# Patient Record
Sex: FEMALE | Race: WHITE | NOT HISPANIC OR LATINO | URBAN - METROPOLITAN AREA
[De-identification: names, ages, dates, MRNs, and addresses within clinical notes are randomized per-mention and may not be internally consistent; named-entity substitution may affect disease eponyms.]

---

## 2017-11-18 ENCOUNTER — OFFICE VISIT (OUTPATIENT)
Dept: URGENT CARE | Facility: URGENT CARE | Age: 22
End: 2017-11-18
Payer: COMMERCIAL

## 2017-11-18 VITALS
BODY MASS INDEX: 24.07 KG/M2 | OXYGEN SATURATION: 97 % | TEMPERATURE: 97.4 F | HEART RATE: 71 BPM | DIASTOLIC BLOOD PRESSURE: 82 MMHG | HEIGHT: 64 IN | SYSTOLIC BLOOD PRESSURE: 102 MMHG | WEIGHT: 141 LBS

## 2017-11-18 DIAGNOSIS — R07.0 THROAT PAIN: ICD-10-CM

## 2017-11-18 DIAGNOSIS — J03.90 EXUDATIVE TONSILLITIS: Primary | ICD-10-CM

## 2017-11-18 LAB
DEPRECATED S PYO AG THROAT QL EIA: NORMAL
SPECIMEN SOURCE: NORMAL

## 2017-11-18 PROCEDURE — 99203 OFFICE O/P NEW LOW 30 MIN: CPT | Performed by: INTERNAL MEDICINE

## 2017-11-18 PROCEDURE — 87081 CULTURE SCREEN ONLY: CPT | Performed by: INTERNAL MEDICINE

## 2017-11-18 PROCEDURE — 87880 STREP A ASSAY W/OPTIC: CPT | Performed by: INTERNAL MEDICINE

## 2017-11-18 RX ORDER — AMOXICILLIN 500 MG/1
500 CAPSULE ORAL 3 TIMES DAILY
Qty: 30 CAPSULE | Refills: 0 | Status: SHIPPED | OUTPATIENT
Start: 2017-11-18 | End: 2019-05-08

## 2017-11-18 ASSESSMENT — ENCOUNTER SYMPTOMS
MYALGIAS: 1
COUGH: 0
HEADACHES: 1
CHILLS: 0
NECK PAIN: 1

## 2017-11-18 NOTE — NURSING NOTE
"Chief Complaint   Patient presents with     Urgent Care     Pt in clinic c/o sore throat for 1 day.     Pharyngitis       Initial /82  Pulse 71  Temp 97.4  F (36.3  C) (Tympanic)  Ht 5' 4\" (1.626 m)  Wt 141 lb (64 kg)  SpO2 97%  BMI 24.2 kg/m2 Estimated body mass index is 24.2 kg/(m^2) as calculated from the following:    Height as of this encounter: 5' 4\" (1.626 m).    Weight as of this encounter: 141 lb (64 kg).  Medication Reconciliation: complete   Shannon Holden/ MA    "

## 2017-11-18 NOTE — PROGRESS NOTES
"SUBJECTIVE:   Cely Monae is a 22 year old female presenting with a chief complaint of   Chief Complaint   Patient presents with     Urgent Care     Pt in clinic c/o sore throat for 1 day.     Pharyngitis   .    Onset of symptoms was 1 day(s) ago.  Current and Associated symptoms: sore throat  Treatment measures tried include Tylenol/Ibuprofen.  Predisposing factors include None.    Also sick 2 weeks ago with sore throat - resolved       Review of Systems   Constitutional: Negative for chills.        Feels warm   HENT: Negative for ear pain.    Respiratory: Negative for cough.    Musculoskeletal: Positive for myalgias and neck pain.   Neurological: Positive for headaches.         Past Medical History:   Diagnosis Date     NO ACTIVE PROBLEMS      Current Outpatient Prescriptions   Medication Sig Dispense Refill     amoxicillin (AMOXIL) 500 MG capsule Take 1 capsule (500 mg) by mouth 3 times daily 30 capsule 0     Social History   Substance Use Topics     Smoking status: Never Smoker     Smokeless tobacco: Never Used     Alcohol use Not on file       OBJECTIVE  /82  Pulse 71  Temp 97.4  F (36.3  C) (Tympanic)  Ht 5' 4\" (1.626 m)  Wt 141 lb (64 kg)  SpO2 97%  BMI 24.2 kg/m2    Physical Exam   Constitutional: She is well-developed, well-nourished, and in no distress.   fatigued   HENT:   Nose: Nose normal.   Mouth/Throat: No oropharyngeal exudate.   tympanic membrane clear  OP with lot PND   Eyes: Conjunctivae are normal.   Cardiovascular: Normal rate, regular rhythm and normal heart sounds.    Pulmonary/Chest: Effort normal and breath sounds normal. She has no wheezes.   Lymphadenopathy:     She has cervical adenopathy.       Labs:  Results for orders placed or performed in visit on 11/18/17 (from the past 24 hour(s))   Strep, Rapid Screen   Result Value Ref Range    Specimen Description Throat     Rapid Strep A Screen       NEGATIVE: No Group A streptococcal antigen detected by immunoassay, await " culture report.       X-Ray was not done.    ASSESSMENT:      ICD-10-CM    1. Exudative tonsillitis J03.90 amoxicillin (AMOXIL) 500 MG capsule   2. Throat pain R07.0 Strep, Rapid Screen     Beta strep group A culture        Medical Decision Making:    Differential Diagnosis:  strep    Serious Comorbid Conditions:  none    PLAN:      Patient Instructions     Fluids  Rest  ibuprofen   amox 3 x day for 10 days.    Specimen Description Throat     Rapid Strep A Screen NEGATIVE: No Group A streptococcal antigen detected by immunoassay, await culture report.     [facility]

## 2017-11-18 NOTE — MR AVS SNAPSHOT
"              After Visit Summary   11/18/2017    Cely Monae    MRN: [id number]           Patient Information     Date Of Birth          1995        Visit Information        Provider Department      11/18/2017 8:15 AM Dalia Goff MD [facility]        Today's Diagnoses     Exudative tonsillitis    -  1    Throat pain          Care Instructions    Fluids  Rest  ibuprofen   amox 3 x day for 10 days.    Specimen Description Throat     Rapid Strep A Screen NEGATIVE: No Group A streptococcal antigen detected by immunoassay, await culture report.     Resulting Agency [facility]                Follow-ups after your visit        Who to contact     If you have questions or need follow up information about today's clinic visit or your schedule please contact [facility] directly at [phone].  Normal or non-critical lab and imaging results will be communicated to you by MyChart, letter or phone within 4 business days after the clinic has received the results. If you do not hear from us within 7 days, please contact the clinic through MyChart or phone. If you have a critical or abnormal lab result, we will notify you by phone as soon as possible.  Submit refill requests through [facility] or call your pharmacy and they will forward the refill request to us. Please allow 3 business days for your refill to be completed.          Additional Information About Your Visit        MyChart Information     [facility] lets you send messages to your doctor, view your test results, renew your prescriptions, schedule appointments and more. To sign up, go to [email or web address] . Click on \"Log in\" on the left side of the screen, which will take you to the Welcome page. Then click on \"Sign up Now\" on the right side of the page.     You will be asked to enter the access code listed below, as well as some personal information. Please follow the " "directions to create your username and password.     Your access code is: [email or web address]  Expires: 2018  8:46 AM     Your access code will  in 90 days. If you need help or a new code, please call your [facility] clinic or [phone].        Care EveryWhere ID     This is your Care EveryWhere ID. This could be used by other organizations to access your [facility] medical records  [id number]        Your Vitals Were     Pulse Temperature Height Pulse Oximetry BMI (Body Mass Index)       71 97.4  F (36.3  C) (Tympanic) 5' 4\" (1.626 m) 97% 24.2 kg/m2        Blood Pressure from Last 3 Encounters:   17 102/82    Weight from Last 3 Encounters:   17 141 lb (64 kg)              We Performed the Following     Beta strep group A culture     Strep, Rapid Screen          Today's Medication Changes          These changes are accurate as of: 17  8:52 AM.  If you have any questions, ask your nurse or doctor.               Start taking these medicines.        Dose/Directions    amoxicillin 500 MG capsule   Commonly known as:  AMOXIL   Used for:  Exudative tonsillitis   Started by:  Dalia Goff MD        Dose:  500 mg   Take 1 capsule (500 mg) by mouth 3 times daily   Quantity:  30 capsule   Refills:  0            Where to get your medicines      These medications were sent to [facility] - [place], [place] - [address]  [address], [place] [address]     Phone:  [phone]     amoxicillin 500 MG capsule                Primary Care Provider    Physician No Ref-Primary       NO REF-PRIMARY PHYSICIAN        Equal Access to Services     [facility]: Hadii luis antonio bryant hadasho Soomaali, waaxda luqadaha, qaybta kaalmada adeegtiti, nina lucero. So [facility] [phone].    ATENCIÓN: Si habla español, tiene a young disposición servicios gratuitos de asistencia lingüística. Llame al [phone].    We comply with applicable federal civil rights laws and " [place] laws. We do not discriminate on the basis of race, color, national origin, age, disability, sex, sexual orientation, or gender identity.            Thank you!     Thank you for choosing [facility]  for your care. Our goal is always to provide you with excellent care. Hearing back from our patients is one way we can continue to improve our services. Please take a few minutes to complete the written survey that you may receive in the mail after your visit with us. Thank you!             Your Updated Medication List - Protect others around you: Learn how to safely use, store and throw away your medicines at www.disposemymeds.org.          This list is accurate as of: 11/18/17  8:52 AM.  Always use your most recent med list.                   Brand Name Dispense Instructions for use Diagnosis    amoxicillin 500 MG capsule    AMOXIL    30 capsule    Take 1 capsule (500 mg) by mouth 3 times daily    Exudative tonsillitis

## 2017-11-18 NOTE — PATIENT INSTRUCTIONS
Fluids  Rest  ibuprofen   amox 3 x day for 10 days.    Specimen Description Throat     Rapid Strep A Screen NEGATIVE: No Group A streptococcal antigen detected by immunoassay, await culture report.     [facility]

## 2017-11-19 LAB
BACTERIA SPEC CULT: NORMAL
SPECIMEN SOURCE: NORMAL

## 2019-05-08 ENCOUNTER — OFFICE VISIT (OUTPATIENT)
Dept: FAMILY MEDICINE | Facility: CLINIC | Age: 24
End: 2019-05-08
Payer: COMMERCIAL

## 2019-05-08 ENCOUNTER — RECORDS - HEALTHEAST (OUTPATIENT)
Dept: ADMINISTRATIVE | Facility: OTHER | Age: 24
End: 2019-05-08

## 2019-05-08 VITALS
HEIGHT: 66 IN | DIASTOLIC BLOOD PRESSURE: 62 MMHG | BODY MASS INDEX: 22.69 KG/M2 | OXYGEN SATURATION: 98 % | WEIGHT: 141.2 LBS | HEART RATE: 51 BPM | SYSTOLIC BLOOD PRESSURE: 97 MMHG | RESPIRATION RATE: 16 BRPM | TEMPERATURE: 98.5 F

## 2019-05-08 DIAGNOSIS — J03.90 TONSILLITIS: Primary | ICD-10-CM

## 2019-05-08 LAB — MONONUCLEOSIS SCREEN: NEGATIVE

## 2019-05-08 ASSESSMENT — MIFFLIN-ST. JEOR: SCORE: 1414.29

## 2019-05-08 NOTE — PROGRESS NOTES
"Preceptor attestation:  Vital signs reviewed: BP 97/62   Pulse 51   Temp 98.5  F (36.9  C) (Oral)   Resp 16   Ht 1.664 m (5' 5.5\")   Wt 64 kg (141 lb 3.2 oz)   SpO2 98%   BMI 23.14 kg/m      Patient seen, evaluated, and discussed with the resident.  I have verified the content of the note, which accurately reflects my assessment of the patient and the plan of care.    Supervising physician: Patti Han MD  [facility]  "

## 2019-05-08 NOTE — LETTER
May 13, 2019      Cely Monae  [address] [place] [address]        Dear Cely,    Your mono test was negative.     Thank you!     Dr. Egan     Please see below for your test results.    Resulted Orders   Mononucleosis Screen ([facility])   Result Value Ref Range    Mononucleosis Screen NEGATIVE Negative       If you have any questions, please call the clinic to make an appointment.    Sincerely,    Katya Egan MD

## 2019-05-08 NOTE — PATIENT INSTRUCTIONS
Dear Cely Monae,    It was a pleasure seeing you in clinic today.  We will call with Mono test results.  Referral to ENT placed today.   Continue using salt water gargles and taking ibuprofen as needed.   If your symptoms are not improving by Friday please call the clinic.   Please do not hesitate to call or return to clinic if you have an questions or concerns.      Sincerely,    Dr. Egan    May 8, 2019    OTOLARYNGOLOGY REFERRAL   [facility]  Phone: [phone]  Fax: [phone]    [facility]  [address], [address]  [place] [address]    [facility]  [address] [place], [place]  [id number] [place] [address]  [address] [place] [address]  Faxed referral to ENT clinic who will contact patient to schedule appointment. Rosita BENSON  Please bring a copy of your insurance card and photo ID    If you cannot make this appointment, please call [phone] to reschedule   Referral, demographics and office note faxed to [phone]. Rosita Recinos [facility]    Surgery Scheduling can be reached at [phone].    3/26/19 If this is related to hearing aids, [facility] works with only four brands.  Shari Regalado  Will need to know which type patient has prior to scheduling. [facility]

## 2019-05-08 NOTE — PROGRESS NOTES
"SUBJECTIVE  HPI: Cely Monae is a 23 year old female who presents with complaints of sore throat. It started on 5/6/19. She went to [facility] ED that night. She was given a shot of bicillin and decadron there. She states that the sharp pain that she was having in her throat has resolved but her throat still feels very swollen.  She reports difficulty swallowing secondary to the swelling and has been mostly eating soft foods and liquids.  Reports good p.o. intake.  Denies any associated fevers.  She has been gargling with salt water at home and taking ibuprofen which has been helpful.  She states that she has felt fatigued for about 1 week.  Reports a runny nose of about 3 weeks duration that she associates with seasonal allergies.  No abdominal pain, vomiting, rashes.  States that she was referred to ENT in the past but did not go to the appointment due to insurance issues.    ROS: per HPI.     PMH: Reports hx of recurrent tonsillitis and strep throat infections.    Social Hx:  Social History     Social History Narrative     Not on file     History   Smoking Status     Never Smoker   Smokeless Tobacco     Never Used       OBJECTIVE:  Vitals: BP 97/62   Pulse 51   Temp 98.5  F (36.9  C) (Oral)   Resp 16   Ht 1.664 m (5' 5.5\")   Wt 64 kg (141 lb 3.2 oz)   SpO2 98%   BMI 23.14 kg/m    General: Pleasant. Young woman. No distress.  HEENT: Moist mucous membranes. Extraocular movements intact. Sclera non-injected. Pupils equal, round, and reactive to light. Tympanic membranes clear bilaterally. Hearing grossly intact. Clear nasal discharge.  Edematous, erythematous tonsils with exudates, appear symmetric in size, +2-+3.  Shotty anterior cervical lymphadenopathy. Neck supple with full range of motion. No nuchal rigidity. No thyromegaly. No trismus.    Heart: Regular rate and rhythm. No murmurs, rubs, or gallops.  Extremities: Extremities warm and well-perfused.  Lungs: Clear to auscultation bilaterally. No " wheezes or crackles. Good air movement.  GI: Abdomen normal to inspection. No ridigidity, distension, or guarding. Normoactive bowel sounds. Soft and non-tender to palpation throughout abdomen. No splenomegaly.    Results for orders placed or performed in visit on 11/18/17   Strep, Rapid Screen   Result Value Ref Range    Specimen Description Throat     Rapid Strep A Screen       NEGATIVE: No Group A streptococcal antigen detected by immunoassay, await culture report.   Beta strep group A culture   Result Value Ref Range    Specimen Description Throat     Culture Micro No beta hemolytic Streptococcus Group A isolated          ASSESSMENT & PLAN:    Cely was seen today for hospital f/u.    Diagnoses and all orders for this visit:    Tonsillitis: Bacterial vs viral tonsillitis. Strep negative. Seen in ED yesterday and treated with Bicillin, Decadron for acute pharyngitis with slight improvement today. Afebrile, appears well with grossly normal exam except for findings c/w tonsillitis. No trismus, drooling, dysphonia. Strep screen and PCR negative in ED on 5/7/19.  Will rule out mononucleosis with screen today. Will refer to ENT as patient has previously been referred for recurrent tonsillitis and has not yet been to see them.  Advised the patient to continue using salt water gargles and take ibuprofen as needed.  Advised patient that if her symptoms are not improving by Friday, she should call clinic. If she has persistent or worsening symptoms would prescribe augmentin or clindamycin for broader coverage for possible bacterial tonsillitis.  -     OTOLARYNGOLOGY REFERRAL; Future  -     Mononucleosis Screen ([facility])      Orders Placed This Encounter   Procedures     Mononucleosis Screen ([facility])     OTOLARYNGOLOGY REFERRAL           The patient was actively involved in the decision making process, and all the questions were answered to their satisfaction prior to leaving.       Patient precepted with attending  physician, Dr. Han.    Katya Egan, DO   PGY-3 [facility]  Pager: [phone]

## 2019-05-09 ENCOUNTER — AMBULATORY - HEALTHEAST (OUTPATIENT)
Dept: ADMINISTRATIVE | Facility: CLINIC | Age: 24
End: 2019-05-09

## 2019-05-09 DIAGNOSIS — J03.01 ACUTE RECURRENT STREPTOCOCCAL TONSILLITIS: ICD-10-CM

## 2019-05-23 ENCOUNTER — OFFICE VISIT (OUTPATIENT)
Dept: FAMILY MEDICINE | Facility: CLINIC | Age: 24
End: 2019-05-23
Payer: COMMERCIAL

## 2019-05-23 ENCOUNTER — RECORDS - HEALTHEAST (OUTPATIENT)
Dept: ADMINISTRATIVE | Facility: OTHER | Age: 24
End: 2019-05-23

## 2019-05-23 ENCOUNTER — TELEPHONE (OUTPATIENT)
Dept: FAMILY MEDICINE | Facility: CLINIC | Age: 24
End: 2019-05-23

## 2019-05-23 VITALS
BODY MASS INDEX: 22.6 KG/M2 | TEMPERATURE: 98.5 F | OXYGEN SATURATION: 99 % | SYSTOLIC BLOOD PRESSURE: 104 MMHG | DIASTOLIC BLOOD PRESSURE: 69 MMHG | RESPIRATION RATE: 20 BRPM | HEIGHT: 66 IN | WEIGHT: 140.6 LBS | HEART RATE: 49 BPM

## 2019-05-23 DIAGNOSIS — J30.1 SEASONAL ALLERGIC RHINITIS DUE TO POLLEN: Primary | ICD-10-CM

## 2019-05-23 RX ORDER — IBUPROFEN 200 MG
800 TABLET ORAL EVERY 6 HOURS PRN
COMMUNITY

## 2019-05-23 RX ORDER — FLUTICASONE PROPIONATE 50 MCG
2 SPRAY, SUSPENSION (ML) NASAL 2 TIMES DAILY
Qty: 16 G | Refills: 11 | Status: SHIPPED | OUTPATIENT
Start: 2019-05-23

## 2019-05-23 RX ORDER — CETIRIZINE HYDROCHLORIDE 10 MG/1
10 TABLET ORAL DAILY
Qty: 90 TABLET | Refills: 3 | Status: SHIPPED | OUTPATIENT
Start: 2019-05-23

## 2019-05-23 ASSESSMENT — MIFFLIN-ST. JEOR: SCORE: 1421.76

## 2019-05-23 ASSESSMENT — ANXIETY QUESTIONNAIRES
2. NOT BEING ABLE TO STOP OR CONTROL WORRYING: NOT AT ALL
6. BECOMING EASILY ANNOYED OR IRRITABLE: NOT AT ALL
7. FEELING AFRAID AS IF SOMETHING AWFUL MIGHT HAPPEN: NOT AT ALL
5. BEING SO RESTLESS THAT IT IS HARD TO SIT STILL: NOT AT ALL
IF YOU CHECKED OFF ANY PROBLEMS ON THIS QUESTIONNAIRE, HOW DIFFICULT HAVE THESE PROBLEMS MADE IT FOR YOU TO DO YOUR WORK, TAKE CARE OF THINGS AT HOME, OR GET ALONG WITH OTHER PEOPLE: NOT DIFFICULT AT ALL
3. WORRYING TOO MUCH ABOUT DIFFERENT THINGS: NOT AT ALL
1. FEELING NERVOUS, ANXIOUS, OR ON EDGE: NOT AT ALL
GAD7 TOTAL SCORE: 0

## 2019-05-23 ASSESSMENT — PAIN SCALES - GENERAL: PAINLEVEL: SEVERE PAIN (7)

## 2019-05-23 ASSESSMENT — PATIENT HEALTH QUESTIONNAIRE - PHQ9
SUM OF ALL RESPONSES TO PHQ QUESTIONS 1-9: 0
5. POOR APPETITE OR OVEREATING: NOT AT ALL

## 2019-05-23 NOTE — NURSING NOTE
Chief Complaint   Patient presents with     RECHECK     Thorat infection F/U. Still having Throat pain      Due vaccines were not given today because records from a clinic in [place] are needed first. Patients states she got multiple vaccines there.      Christiano Beauchamp, CMA

## 2019-05-23 NOTE — TELEPHONE ENCOUNTER
[facility] phone call message- general phone call:    Reason for call: Flonase he needs to verify the directions.    Return call needed: Yes    OK to leave a message on voice mail? Yes    Primary language: English      needed? No    Call taken on May 23, 2019 at 12:28 PM by Alberto Corrigan

## 2019-05-24 ASSESSMENT — ANXIETY QUESTIONNAIRES: GAD7 TOTAL SCORE: 0

## 2019-05-24 NOTE — PROGRESS NOTES
"Subjective: Cely Monae is a 23 year old who presents today for f/u with sore throat now for about 2 weeks.  She was seen on 5/7/19 in the ED and given decadron and PCN.  Her Strep swab was negative.  She was seen on 5/8/19 in the clinic and here had a negative Monospot although she had only had sxs for 2-3 days at that time.    She reports feeling better for about a week and now her sxs have returned.  She has pain with swallowing but is able to swallow.  There is no food getting stuck, no nausea and no vomiting.    PMHX/PSHX/MEDS/ALLERGIES/SHX/FHX reviewed and updated in Epic.   ROS:   General: No fevers, chills   Head: No headache   CV: No chest pain   Resp: No shortness of breath. No cough.  GI: No nausea or vomiting.   Objective: /69   Pulse (!) 49   Temp 98.5  F (36.9  C) (Oral)   Resp 20   Ht 1.68 m (5' 6.14\")   Wt 63.8 kg (140 lb 9.6 oz)   SpO2 99%   Breastfeeding? No   BMI 22.60 kg/m     Gen: Well nourished and in NAD   HEENT: TMs normal color and landmarks, nasopharynx pink and moist, oropharynx pink and moist with 3+ tonsils but no exudates   Neck: bilateral shotty lymphadenopathy anteriorly and no posterior adenopathy  CV: RRR - no murmurs, rubs, or gallups  Pulm: Clear to auscultation without wheezing or crackles  ABD: soft, nontender, BS intact; no hepatosplenomegaly  Extrem: no cyanosis, edema or clubbing   Psych: Euthymic  Skin: No rash    Assessment/ Plan:  1. Seasonal allergic rhinitis due to pollen  I think her ST is from allergies.  She has obstructing tonsils and should have them removed.  - OTOLARYNGOLOGY REFERRAL; Future  - fluticasone (FLONASE) 50 MCG/ACT nasal spray; Spray 2 sprays into both nostrils 2 times daily  Dispense: 16 g; Refill: 11  - cetirizine (ZYRTEC) 10 MG tablet; Take 1 tablet (10 mg) by mouth daily  Dispense: 90 tablet; Refill: 3    Total of 26 minutes was spent in face to face contact with patient with > 50% in chart review and counseling.  Options for " treatment and/or follow-up care were reviewed with the patient. Cely Monae was engaged and actively involved in the decision making process. She verbalized understanding of the options discussed and was satisfied with the final plan.      Lisandro Peters

## 2019-05-28 NOTE — PATIENT INSTRUCTIONS
May 28, 2019    OTOLARYNGOLOGY REFERRAL   [facility]  Phone: [phone]  Fax: [phone]    [facility]  [address], [address]  [place] [address]    [facility] [[email or web address]  [address] [place]  [address]    [facility]  [address] [place] [address]    Please bring a copy of your insurance card and photo ID    If you cannot make this appointment, please call [phone] to reschedule     Referral, demographics and office note faxed to [phone], who will contact patient to schedule appointment.  Rosita Recinos, [facility]    Surgery Scheduling can be reached at [phone].    3/26/19 If this is related to hearing aids, [facility] works with only four brands.  Shari Regalado  Will need to know which type patient has prior to scheduling. [facility]

## 2019-05-31 ENCOUNTER — AMBULATORY - HEALTHEAST (OUTPATIENT)
Dept: ADMINISTRATIVE | Facility: CLINIC | Age: 24
End: 2019-05-31

## 2019-05-31 DIAGNOSIS — J31.0 CHRONIC RHINITIS: ICD-10-CM

## 2021-08-14 ENCOUNTER — HEALTH MAINTENANCE LETTER (OUTPATIENT)
Age: 26
End: 2021-08-14

## 2021-10-09 ENCOUNTER — HEALTH MAINTENANCE LETTER (OUTPATIENT)
Age: 26
End: 2021-10-09

## 2022-09-17 ENCOUNTER — HEALTH MAINTENANCE LETTER (OUTPATIENT)
Age: 27
End: 2022-09-17

## 2023-10-07 ENCOUNTER — HEALTH MAINTENANCE LETTER (OUTPATIENT)
Age: 28
End: 2023-10-07